# Patient Record
Sex: MALE
[De-identification: names, ages, dates, MRNs, and addresses within clinical notes are randomized per-mention and may not be internally consistent; named-entity substitution may affect disease eponyms.]

---

## 2022-07-21 ENCOUNTER — HOSPITAL ENCOUNTER (EMERGENCY)
Dept: HOSPITAL 83 - ED | Age: 47
Discharge: NURSING, REHAB OR LONG-TERM CARE | End: 2022-07-21
Payer: COMMERCIAL

## 2022-07-21 ENCOUNTER — HOSPITAL ENCOUNTER (INPATIENT)
Age: 47
LOS: 1 days | Discharge: HOME | DRG: 247 | End: 2022-07-23
Attending: FAMILY MEDICINE | Admitting: INTERNAL MEDICINE
Payer: COMMERCIAL

## 2022-07-21 VITALS — WEIGHT: 188 LBS | HEIGHT: 60 IN

## 2022-07-21 DIAGNOSIS — Z88.0: ICD-10-CM

## 2022-07-21 DIAGNOSIS — I10 HYPERTENSION, UNSPECIFIED TYPE: Primary | ICD-10-CM

## 2022-07-21 DIAGNOSIS — Z79.899: ICD-10-CM

## 2022-07-21 DIAGNOSIS — I21.09: Primary | ICD-10-CM

## 2022-07-21 PROBLEM — I21.3 STEMI (ST ELEVATION MYOCARDIAL INFARCTION) (HCC): Status: ACTIVE | Noted: 2022-07-21

## 2022-07-21 PROBLEM — I21.02: Status: ACTIVE | Noted: 2022-07-21

## 2022-07-21 LAB
ABO/RH: NORMAL
ALBUMIN SERPL-MCNC: 4.2 G/DL (ref 3.5–5.2)
ALP BLD-CCNC: 47 U/L (ref 40–129)
ALP SERPL-CCNC: 52 U/L (ref 45–117)
ALT SERPL W P-5'-P-CCNC: 35 U/L (ref 12–78)
ALT SERPL-CCNC: 20 U/L (ref 0–40)
ANION GAP SERPL CALCULATED.3IONS-SCNC: 14 MMOL/L (ref 7–16)
ANTIBODY SCREEN: NORMAL
APTT PPP: 23.2 SECONDS (ref 20–32.1)
AST SERPL-CCNC: 22 IU/L (ref 3–35)
AST SERPL-CCNC: 23 U/L (ref 0–39)
BASOPHILS # BLD AUTO: 0 10*3/UL (ref 0–0.1)
BASOPHILS NFR BLD AUTO: 0.3 % (ref 0–1)
BILIRUB SERPL-MCNC: 0.3 MG/DL (ref 0–1.2)
BUN BLDV-MCNC: 13 MG/DL (ref 6–20)
BUN SERPL-MCNC: 14 MG/DL (ref 7–24)
CALCIUM SERPL-MCNC: 8.3 MG/DL (ref 8.6–10.2)
CHLORIDE BLD-SCNC: 101 MMOL/L (ref 98–107)
CHLORIDE SERPL-SCNC: 106 MMOL/L (ref 98–107)
CO2: 23 MMOL/L (ref 22–29)
CREAT SERPL-MCNC: 0.8 MG/DL (ref 0.7–1.2)
CREAT SERPL-MCNC: 0.98 MG/DL (ref 0.7–1.3)
EOSINOPHIL # BLD AUTO: 0.1 10*3/UL (ref 0–0.4)
EOSINOPHIL # BLD AUTO: 0.7 % (ref 1–4)
ERYTHROCYTE [DISTWIDTH] IN BLOOD BY AUTOMATED COUNT: 12.5 % (ref 0–14.5)
GFR AFRICAN AMERICAN: >60
GFR NON-AFRICAN AMERICAN: >60 ML/MIN/1.73
GLUCOSE BLD-MCNC: 136 MG/DL (ref 74–99)
HBA1C MFR BLD: 5.3 % (ref 4–5.6)
HCT VFR BLD AUTO: 44.6 % (ref 42–52)
HCT VFR BLD CALC: 39.8 % (ref 37–54)
HEMOGLOBIN: 14.3 G/DL (ref 12.5–16.5)
INR BLD: 1 (ref 2–3.5)
INR BLD: 1.2
LIPASE SERPL-CCNC: 93 U/L (ref 73–393)
LYMPHOCYTES # BLD AUTO: 1.8 10*3/UL (ref 1.3–4.4)
LYMPHOCYTES NFR BLD AUTO: 19 % (ref 27–41)
MCH RBC QN AUTO: 29.4 PG (ref 26–35)
MCH RBC QN AUTO: 29.6 PG (ref 27–31)
MCHC RBC AUTO-ENTMCNC: 35.9 % (ref 32–34.5)
MCHC RBC AUTO-ENTMCNC: 35.9 G/DL (ref 33–37)
MCV RBC AUTO: 81.7 FL (ref 80–99.9)
MCV RBC AUTO: 82.4 FL (ref 80–94)
MONOCYTES # BLD AUTO: 0.6 10*3/UL (ref 0.1–1)
MONOCYTES NFR BLD MANUAL: 5.9 % (ref 3–9)
NEUT #: 7.1 10*3/UL (ref 2.3–7.9)
NEUT %: 73.8 % (ref 47–73)
NRBC BLD QL AUTO: 0 10*3/UL (ref 0–0)
PDW BLD-RTO: 12.5 FL (ref 11.5–15)
PLATELET # BLD AUTO: 264 10*3/UL (ref 130–400)
PLATELET # BLD: 268 E9/L (ref 130–450)
PMV BLD AUTO: 10.2 FL (ref 9.6–12.3)
PMV BLD AUTO: 10.5 FL (ref 7–12)
POC ACT LR: 246 SECONDS
POC ACT LR: 337 SECONDS
POTASSIUM SERPL-SCNC: 3 MMOL/L (ref 3.5–5.1)
POTASSIUM SERPL-SCNC: 3.5 MMOL/L (ref 3.5–5)
PROT SERPL-MCNC: 7.4 GM/DL (ref 6.4–8.2)
PROTHROMBIN TIME: 12.5 SEC (ref 9.3–12.4)
RBC # BLD AUTO: 5.41 10*6/UL (ref 4.5–5.9)
RBC # BLD: 4.87 E12/L (ref 3.8–5.8)
SODIUM BLD-SCNC: 138 MMOL/L (ref 132–146)
SODIUM SERPL-SCNC: 140 MMOL/L (ref 136–145)
TOTAL PROTEIN: 6.5 G/DL (ref 6.4–8.3)
TROPONIN, HIGH SENSITIVITY: 3515 NG/L (ref 0–11)
WBC # BLD: 14.7 E9/L (ref 4.5–11.5)
WBC NRBC COR # BLD AUTO: 9.6 10*3/UL (ref 4.8–10.8)

## 2022-07-21 PROCEDURE — 83036 HEMOGLOBIN GLYCOSYLATED A1C: CPT

## 2022-07-21 PROCEDURE — 2500000003 HC RX 250 WO HCPCS

## 2022-07-21 PROCEDURE — 96365 THER/PROPH/DIAG IV INF INIT: CPT

## 2022-07-21 PROCEDURE — 86901 BLOOD TYPING SEROLOGIC RH(D): CPT

## 2022-07-21 PROCEDURE — 93458 L HRT ARTERY/VENTRICLE ANGIO: CPT | Performed by: INTERNAL MEDICINE

## 2022-07-21 PROCEDURE — 2580000003 HC RX 258: Performed by: INTERNAL MEDICINE

## 2022-07-21 PROCEDURE — 93458 L HRT ARTERY/VENTRICLE ANGIO: CPT

## 2022-07-21 PROCEDURE — 92978 ENDOLUMINL IVUS OCT C 1ST: CPT | Performed by: INTERNAL MEDICINE

## 2022-07-21 PROCEDURE — 6360000002 HC RX W HCPCS

## 2022-07-21 PROCEDURE — G0378 HOSPITAL OBSERVATION PER HR: HCPCS

## 2022-07-21 PROCEDURE — 2720000010 HC SURG SUPPLY STERILE

## 2022-07-21 PROCEDURE — C1894 INTRO/SHEATH, NON-LASER: HCPCS

## 2022-07-21 PROCEDURE — 92978 ENDOLUMINL IVUS OCT C 1ST: CPT

## 2022-07-21 PROCEDURE — C1725 CATH, TRANSLUMIN NON-LASER: HCPCS

## 2022-07-21 PROCEDURE — 84484 ASSAY OF TROPONIN QUANT: CPT

## 2022-07-21 PROCEDURE — 85610 PROTHROMBIN TIME: CPT

## 2022-07-21 PROCEDURE — 02C03ZZ EXTIRPATION OF MATTER FROM CORONARY ARTERY, ONE ARTERY, PERCUTANEOUS APPROACH: ICD-10-PCS | Performed by: INTERNAL MEDICINE

## 2022-07-21 PROCEDURE — 2709999900 HC NON-CHARGEABLE SUPPLY

## 2022-07-21 PROCEDURE — C1769 GUIDE WIRE: HCPCS

## 2022-07-21 PROCEDURE — 36415 COLL VENOUS BLD VENIPUNCTURE: CPT

## 2022-07-21 PROCEDURE — 92973 PRQ TRLUML C MCHN ASP THRMBC: CPT | Performed by: INTERNAL MEDICINE

## 2022-07-21 PROCEDURE — B2111ZZ FLUOROSCOPY OF MULTIPLE CORONARY ARTERIES USING LOW OSMOLAR CONTRAST: ICD-10-PCS | Performed by: INTERNAL MEDICINE

## 2022-07-21 PROCEDURE — 4A023N7 MEASUREMENT OF CARDIAC SAMPLING AND PRESSURE, LEFT HEART, PERCUTANEOUS APPROACH: ICD-10-PCS | Performed by: INTERNAL MEDICINE

## 2022-07-21 PROCEDURE — 027034Z DILATION OF CORONARY ARTERY, ONE ARTERY WITH DRUG-ELUTING INTRALUMINAL DEVICE, PERCUTANEOUS APPROACH: ICD-10-PCS | Performed by: INTERNAL MEDICINE

## 2022-07-21 PROCEDURE — 93005 ELECTROCARDIOGRAM TRACING: CPT | Performed by: INTERNAL MEDICINE

## 2022-07-21 PROCEDURE — 92941 PRQ TRLML REVSC TOT OCCL AMI: CPT | Performed by: INTERNAL MEDICINE

## 2022-07-21 PROCEDURE — B241ZZ3 ULTRASONOGRAPHY OF MULTIPLE CORONARY ARTERIES, INTRAVASCULAR: ICD-10-PCS | Performed by: INTERNAL MEDICINE

## 2022-07-21 PROCEDURE — 92941 PRQ TRLML REVSC TOT OCCL AMI: CPT

## 2022-07-21 PROCEDURE — C1887 CATHETER, GUIDING: HCPCS

## 2022-07-21 PROCEDURE — 80053 COMPREHEN METABOLIC PANEL: CPT

## 2022-07-21 PROCEDURE — 99291 CRITICAL CARE FIRST HOUR: CPT | Performed by: INTERNAL MEDICINE

## 2022-07-21 PROCEDURE — 6360000002 HC RX W HCPCS: Performed by: INTERNAL MEDICINE

## 2022-07-21 PROCEDURE — G0379 DIRECT REFER HOSPITAL OBSERV: HCPCS

## 2022-07-21 PROCEDURE — 85347 COAGULATION TIME ACTIVATED: CPT

## 2022-07-21 PROCEDURE — 85027 COMPLETE CBC AUTOMATED: CPT

## 2022-07-21 PROCEDURE — 6370000000 HC RX 637 (ALT 250 FOR IP): Performed by: INTERNAL MEDICINE

## 2022-07-21 PROCEDURE — 86850 RBC ANTIBODY SCREEN: CPT

## 2022-07-21 PROCEDURE — 92973 PRQ TRLUML C MCHN ASP THRMBC: CPT

## 2022-07-21 PROCEDURE — C1874 STENT, COATED/COV W/DEL SYS: HCPCS

## 2022-07-21 PROCEDURE — C1753 CATH, INTRAVAS ULTRASOUND: HCPCS

## 2022-07-21 PROCEDURE — 86900 BLOOD TYPING SEROLOGIC ABO: CPT

## 2022-07-21 RX ORDER — POTASSIUM CHLORIDE 7.45 MG/ML
10 INJECTION INTRAVENOUS
Status: COMPLETED | OUTPATIENT
Start: 2022-07-21 | End: 2022-07-21

## 2022-07-21 RX ORDER — SODIUM CHLORIDE 9 MG/ML
INJECTION, SOLUTION INTRAVENOUS PRN
Status: DISCONTINUED | OUTPATIENT
Start: 2022-07-21 | End: 2022-07-23 | Stop reason: HOSPADM

## 2022-07-21 RX ORDER — ROSUVASTATIN CALCIUM 20 MG/1
40 TABLET, COATED ORAL NIGHTLY
Status: DISCONTINUED | OUTPATIENT
Start: 2022-07-21 | End: 2022-07-23 | Stop reason: HOSPADM

## 2022-07-21 RX ORDER — LABETALOL HYDROCHLORIDE 5 MG/ML
10 INJECTION, SOLUTION INTRAVENOUS EVERY 4 HOURS PRN
Status: DISCONTINUED | OUTPATIENT
Start: 2022-07-21 | End: 2022-07-23 | Stop reason: HOSPADM

## 2022-07-21 RX ORDER — ACETAMINOPHEN 325 MG/1
650 TABLET ORAL EVERY 4 HOURS PRN
Status: DISCONTINUED | OUTPATIENT
Start: 2022-07-21 | End: 2022-07-23 | Stop reason: HOSPADM

## 2022-07-21 RX ORDER — ONDANSETRON 2 MG/ML
4 INJECTION INTRAMUSCULAR; INTRAVENOUS EVERY 6 HOURS PRN
Status: DISCONTINUED | OUTPATIENT
Start: 2022-07-21 | End: 2022-07-23 | Stop reason: HOSPADM

## 2022-07-21 RX ORDER — SODIUM CHLORIDE 0.9 % (FLUSH) 0.9 %
5-40 SYRINGE (ML) INJECTION PRN
Status: DISCONTINUED | OUTPATIENT
Start: 2022-07-21 | End: 2022-07-23 | Stop reason: HOSPADM

## 2022-07-21 RX ORDER — 0.9 % SODIUM CHLORIDE 0.9 %
500 INTRAVENOUS SOLUTION INTRAVENOUS PRN
Status: DISCONTINUED | OUTPATIENT
Start: 2022-07-21 | End: 2022-07-23 | Stop reason: HOSPADM

## 2022-07-21 RX ORDER — ASPIRIN 81 MG/1
81 TABLET ORAL DAILY
Status: DISCONTINUED | OUTPATIENT
Start: 2022-07-22 | End: 2022-07-23 | Stop reason: HOSPADM

## 2022-07-21 RX ORDER — LISINOPRIL 10 MG/1
10 TABLET ORAL DAILY
Status: DISCONTINUED | OUTPATIENT
Start: 2022-07-22 | End: 2022-07-23 | Stop reason: HOSPADM

## 2022-07-21 RX ORDER — SODIUM CHLORIDE 0.9 % (FLUSH) 0.9 %
5-40 SYRINGE (ML) INJECTION EVERY 12 HOURS SCHEDULED
Status: DISCONTINUED | OUTPATIENT
Start: 2022-07-21 | End: 2022-07-23 | Stop reason: HOSPADM

## 2022-07-21 RX ORDER — METOPROLOL SUCCINATE 25 MG/1
25 TABLET, EXTENDED RELEASE ORAL DAILY
Status: DISCONTINUED | OUTPATIENT
Start: 2022-07-21 | End: 2022-07-23 | Stop reason: HOSPADM

## 2022-07-21 RX ORDER — ATROPINE SULFATE 0.4 MG/ML
0.5 AMPUL (ML) INJECTION
Status: ACTIVE | OUTPATIENT
Start: 2022-07-21 | End: 2022-07-21

## 2022-07-21 RX ADMIN — POTASSIUM CHLORIDE 10 MEQ: 7.45 INJECTION INTRAVENOUS at 20:58

## 2022-07-21 RX ADMIN — METOPROLOL SUCCINATE 25 MG: 25 TABLET, EXTENDED RELEASE ORAL at 23:14

## 2022-07-21 RX ADMIN — SODIUM CHLORIDE, PRESERVATIVE FREE 10 ML: 5 INJECTION INTRAVENOUS at 23:18

## 2022-07-21 RX ADMIN — POTASSIUM CHLORIDE 10 MEQ: 7.45 INJECTION INTRAVENOUS at 22:11

## 2022-07-21 RX ADMIN — ROSUVASTATIN 40 MG: 20 TABLET, FILM COATED ORAL at 23:14

## 2022-07-21 ASSESSMENT — LIFESTYLE VARIABLES: HOW OFTEN DO YOU HAVE A DRINK CONTAINING ALCOHOL: NEVER

## 2022-07-21 NOTE — PROCEDURES
CARDIAC CATHETERIZATION REPORT    : Nannette Smiley MD     Date of procedure: 7/21/22     Indication:  Acute anterior MI    Procedures:  Left heart catheterization, Coronary angiogram, Percutaneous transluminal coronary angioplasty and stenting, IVUS, and mechanical aspiration thrombectomy     Sedation/analgesia:  Midazolam IV     Time sedation was administered: 1859. I was present in the room when sedation was administered. Procedure end time: 1934  Time spent with face to face monitoring of moderate sedation: 35 minutes    Brief history:  56-year-old male with type 2 diabetes who presented to outside ED with 30 minutes of severe substernal chest pain with an EKG consistent with anterior injury. He was loaded with aspirin and ticagrelor and heparin and transferred for emergency coronary angiography and PCI. Killip class I. Description of procedure: The patient presented to the Cath Lab in a(n) emergent fashion. The patient was prepped and draped in a sterile manner. Timeout, airway and ASA assessment were completed. Local anesthesia with 2% lidocaine was administered and sedation/analgesia were administered as above. Access was obtained using the modified Seldinger technique and a micropuncture needle in the right radial artery. A 6F slender sheath was inserted over a wire. Diagnostic angiography was performed using 5F JL3.5 and JR4.0  diagnostic catheters. Coronary anatomy:  Dominance: Right  Left main: angiographically unremarkable  Left anterior descending: This is a large vessel that reaches the apex. It gives rise to a large D1 proximally with a smaller D2. There was 100% thrombotic occlusion just past the takeoff of D1. The distal LAD has mild diffuse disease  Ramus intermedius: Diminutive  Left circumflex: This is a large vessel with mild diffuse disease. OM1 is a large bifurcating vessel, OM 2 is a medium sized vessel, OM 3 is a small vessel.   The very distal circumflex is small as well  Right coronary artery: This is a large dominant vessel with mild diffuse disease. It gives rise to a large RPDA that reaches the apex as well as small posterolateral distribution. Percutaneous coronary intervention:  Anticoagulation: Unfractionated heparin with ACT>250 seconds  Aspirin administered Yes  P2Y12 inhibitor: ticagrelor 180 mg PO loading dose was administered before the case. IV eptifibatide was administered as a double bolus without a continuous infusion. Lesion location: Mid LAD; stenosis 100%; SELINA flow 0. Guiding catheter: EBU 3.5. The lesion was crossed with a Prowater wire with moderate difficulty. Mechanical aspiration thrombectomy using a Conmio CAT Rx catheter was performed for 1 pass with restoration of SELINA-3 flow. IVUS was then performed revealing proximal extension of soft atheroma to the ostium of the LAD. Stenting was performed using a 3.5 x 30 Resolute Fabiano RAI from the mid through the ostial LAD. The stent was postdilated with 4.0 NC balloon at high pressure. Post PCI IVUS was performed and revealed adequate expansion and ostial coverage with some protrusion into the left main. D1 was jailed by the stent without significant stenosis or compromised flow. Residual stenosis 0%; post-PCI SELINA flow 3. Complications: None        Hemodynamics:  LVEDP 18  Ao: 126/85 with a mean of 105      Hemostasis:  Transradial band was applied for patent arterial hemostasis. Complications: None  Estimated blood loss: Less than 50 mL  Air kerma: 886 mGy  Contrast used: 155 mL    Conclusions:  Culprit for acute MI was thrombotic occlusion of the LAD just past the first diagonal.  Successful IVUS guided primary PCI with 3.5 x 30 RAI extending to the ostium and postdilated with 4.0 NC balloon at high pressure. Mild nonobstructive residual CAD  Elevated left filling pressure.       Recommendations:  Admit to cardiac telemetry  Aspirin 81 mg daily and ticagrelor 90 mg p.o. twice daily for at least 12 months  Rosuvastatin 40 mg daily  Beta-blocker  ACE inhibitor if blood pressure tolerates  TTE  Cardiac rehab after discharge  He lives in [place], [place].   After discharge he will follow-up with Dr. Bear Segura at [address]  Interventional Cardiology/Structural Heart Disease  Office: [phone]  Coordinator: Paty Porras

## 2022-07-21 NOTE — CONSULTS
Emergency Cardiology Consult Note:    Narrative:  52-year-old  male with history of type 2 diabetes, non-smoker, no recreational drug use who presented to [place] ED with half an hour of crushing substernal chest pain  He was given aspirin and IV nitroglycerin by the time I was called. CBC is normal      Objective:  Blood pressure 120s over 80s with heart rate in the 80s, on room air    General: moderate distress  Lungs: CTAB  Heart: RRR. No M/R/G  Abdomen: soft, NT/ND  Extremities: no pitting edema. Warm. Neuro: A&Ox3, MAEx4    Twelve-lead EKG with acute anterior injury    Outside labs reviewed with referring emergency room physician. CBC unremarkable. BUN 14, creatinine 0.98, potassium 3.0    Not on File      Assessment:  Acute anterior MI, Killip class I  Type 2 diabetes        Recommendations:  Unfractionated heparin 4000 units IV then 1000 units an hour  Ticagrelor 180 mg p.o. crushed  Emergency transfer for coronary angiography and PCI as needed    Critical care time 35 minutes spent reviewing data, documentation, exam, formulating a therapeutic plan and discussing with family/care team.     We will follow.       Ventura Garcia MD, [facility]  Interventional Cardiology/Structural Heart Disease  Office: [phone]

## 2022-07-22 PROBLEM — I25.5 ISCHEMIC CARDIOMYOPATHY: Status: ACTIVE | Noted: 2022-07-22

## 2022-07-22 PROBLEM — E78.5 HLD (HYPERLIPIDEMIA): Status: ACTIVE | Noted: 2022-07-22

## 2022-07-22 PROBLEM — I10 HTN (HYPERTENSION): Status: ACTIVE | Noted: 2022-07-22

## 2022-07-22 LAB
ANION GAP SERPL CALCULATED.3IONS-SCNC: 12 MMOL/L (ref 7–16)
BUN BLDV-MCNC: 12 MG/DL (ref 6–20)
CALCIUM SERPL-MCNC: 9 MG/DL (ref 8.6–10.2)
CHLORIDE BLD-SCNC: 104 MMOL/L (ref 98–107)
CHOLESTEROL, TOTAL: 256 MG/DL (ref 0–199)
CO2: 24 MMOL/L (ref 22–29)
CREAT SERPL-MCNC: 0.8 MG/DL (ref 0.7–1.2)
EKG ATRIAL RATE: 74 BPM
EKG ATRIAL RATE: 98 BPM
EKG P AXIS: 53 DEGREES
EKG P AXIS: 68 DEGREES
EKG P-R INTERVAL: 138 MS
EKG Q-T INTERVAL: 340 MS
EKG Q-T INTERVAL: 368 MS
EKG QRS DURATION: 122 MS
EKG QRS DURATION: 80 MS
EKG QTC CALCULATION (BAZETT): 377 MS
EKG QTC CALCULATION (BAZETT): 467 MS
EKG R AXIS: -88 DEGREES
EKG R AXIS: 92 DEGREES
EKG T AXIS: 57 DEGREES
EKG T AXIS: 82 DEGREES
EKG VENTRICULAR RATE: 74 BPM
EKG VENTRICULAR RATE: 97 BPM
GFR AFRICAN AMERICAN: >60
GFR NON-AFRICAN AMERICAN: >60 ML/MIN/1.73
GLUCOSE BLD-MCNC: 112 MG/DL (ref 74–99)
HCT VFR BLD CALC: 45.9 % (ref 37–54)
HDLC SERPL-MCNC: 51 MG/DL
HEMOGLOBIN: 16.3 G/DL (ref 12.5–16.5)
LDL CHOLESTEROL CALCULATED: 180 MG/DL (ref 0–99)
LV EF: 38 %
LVEF MODALITY: NORMAL
MCH RBC QN AUTO: 30 PG (ref 26–35)
MCHC RBC AUTO-ENTMCNC: 35.5 % (ref 32–34.5)
MCV RBC AUTO: 84.4 FL (ref 80–99.9)
PDW BLD-RTO: 12.7 FL (ref 11.5–15)
PLATELET # BLD: 269 E9/L (ref 130–450)
PMV BLD AUTO: 10.1 FL (ref 7–12)
POTASSIUM SERPL-SCNC: 3.4 MMOL/L (ref 3.5–5)
RBC # BLD: 5.44 E12/L (ref 3.8–5.8)
SODIUM BLD-SCNC: 140 MMOL/L (ref 132–146)
TRIGL SERPL-MCNC: 123 MG/DL (ref 0–149)
TROPONIN, HIGH SENSITIVITY: 4519 NG/L (ref 0–11)
TROPONIN, HIGH SENSITIVITY: 5388 NG/L (ref 0–11)
TROPONIN, HIGH SENSITIVITY: 6900 NG/L (ref 0–11)
TROPONIN, HIGH SENSITIVITY: 7289 NG/L (ref 0–11)
VLDLC SERPL CALC-MCNC: 25 MG/DL
WBC # BLD: 10.3 E9/L (ref 4.5–11.5)

## 2022-07-22 PROCEDURE — C1894 INTRO/SHEATH, NON-LASER: HCPCS

## 2022-07-22 PROCEDURE — 2140000000 HC CCU INTERMEDIATE R&B

## 2022-07-22 PROCEDURE — 93306 TTE W/DOPPLER COMPLETE: CPT

## 2022-07-22 PROCEDURE — 85027 COMPLETE CBC AUTOMATED: CPT

## 2022-07-22 PROCEDURE — 80061 LIPID PANEL: CPT

## 2022-07-22 PROCEDURE — C1725 CATH, TRANSLUMIN NON-LASER: HCPCS

## 2022-07-22 PROCEDURE — 2720000010 HC SURG SUPPLY STERILE

## 2022-07-22 PROCEDURE — 99233 SBSQ HOSP IP/OBS HIGH 50: CPT | Performed by: INTERNAL MEDICINE

## 2022-07-22 PROCEDURE — 93005 ELECTROCARDIOGRAM TRACING: CPT | Performed by: INTERNAL MEDICINE

## 2022-07-22 PROCEDURE — 2580000003 HC RX 258: Performed by: INTERNAL MEDICINE

## 2022-07-22 PROCEDURE — C1887 CATHETER, GUIDING: HCPCS

## 2022-07-22 PROCEDURE — C1874 STENT, COATED/COV W/DEL SYS: HCPCS

## 2022-07-22 PROCEDURE — C1753 CATH, INTRAVAS ULTRASOUND: HCPCS

## 2022-07-22 PROCEDURE — 2709999900 HC NON-CHARGEABLE SUPPLY

## 2022-07-22 PROCEDURE — 84484 ASSAY OF TROPONIN QUANT: CPT

## 2022-07-22 PROCEDURE — 36415 COLL VENOUS BLD VENIPUNCTURE: CPT

## 2022-07-22 PROCEDURE — C1769 GUIDE WIRE: HCPCS

## 2022-07-22 PROCEDURE — 6370000000 HC RX 637 (ALT 250 FOR IP): Performed by: INTERNAL MEDICINE

## 2022-07-22 PROCEDURE — 80048 BASIC METABOLIC PNL TOTAL CA: CPT

## 2022-07-22 RX ORDER — POTASSIUM CHLORIDE 20 MEQ/1
40 TABLET, EXTENDED RELEASE ORAL ONCE
Status: COMPLETED | OUTPATIENT
Start: 2022-07-22 | End: 2022-07-22

## 2022-07-22 RX ORDER — HYDROCHLOROTHIAZIDE 25 MG/1
25 TABLET ORAL DAILY
Status: ON HOLD | COMMUNITY
End: 2022-07-23 | Stop reason: HOSPADM

## 2022-07-22 RX ORDER — ROSUVASTATIN CALCIUM 5 MG/1
5 TABLET, COATED ORAL DAILY
Status: ON HOLD | COMMUNITY
End: 2022-07-23 | Stop reason: HOSPADM

## 2022-07-22 RX ADMIN — TICAGRELOR 90 MG: 90 TABLET ORAL at 07:28

## 2022-07-22 RX ADMIN — LISINOPRIL 10 MG: 10 TABLET ORAL at 09:03

## 2022-07-22 RX ADMIN — METOPROLOL SUCCINATE 25 MG: 25 TABLET, EXTENDED RELEASE ORAL at 07:28

## 2022-07-22 RX ADMIN — POTASSIUM CHLORIDE 40 MEQ: 1500 TABLET, EXTENDED RELEASE ORAL at 09:03

## 2022-07-22 RX ADMIN — ROSUVASTATIN 40 MG: 20 TABLET, FILM COATED ORAL at 21:18

## 2022-07-22 RX ADMIN — ASPIRIN 81 MG: 81 TABLET, COATED ORAL at 07:28

## 2022-07-22 RX ADMIN — SODIUM CHLORIDE, PRESERVATIVE FREE 10 ML: 5 INJECTION INTRAVENOUS at 21:20

## 2022-07-22 RX ADMIN — ACETAMINOPHEN 325MG 650 MG: 325 TABLET ORAL at 19:43

## 2022-07-22 RX ADMIN — TICAGRELOR 90 MG: 90 TABLET ORAL at 21:18

## 2022-07-22 RX ADMIN — SODIUM CHLORIDE, PRESERVATIVE FREE 10 ML: 5 INJECTION INTRAVENOUS at 07:31

## 2022-07-22 ASSESSMENT — PAIN SCALES - GENERAL
PAINLEVEL_OUTOF10: 2
PAINLEVEL_OUTOF10: 3
PAINLEVEL_OUTOF10: 0

## 2022-07-22 ASSESSMENT — PAIN DESCRIPTION - LOCATION
LOCATION: CHEST
LOCATION: CHEST;BACK

## 2022-07-22 ASSESSMENT — PAIN DESCRIPTION - ORIENTATION: ORIENTATION: MID

## 2022-07-22 ASSESSMENT — PAIN DESCRIPTION - DESCRIPTORS: DESCRIPTORS: ACHING;DULL;NAGGING

## 2022-07-22 NOTE — CONSULTS
Met with patient and discussed that their physician has ordered a referral to our outpatient Phase II Cardiac Rehabilitation program. Reviewed the benefits of cardiac rehabilitation based on their diagnosis and personal risk factors. Patient demonstrates strong interest in Cardiac Rehabilitation at this time. Cardiac Rehabilitation brochure provided to patient/family. The Cardiac Rehabilitation Program has been provided the patient's referral information and pertinent patient details and history. The patient may call [address] [place] [address] at [phone] for additional information or questions. Contact information for [address] [place] [address] and other choices close to the patient's residence have been provided in the discharge instructions so that the patient may call and schedule an appointment when cleared by their physician.  Thank you for the referral.

## 2022-07-22 NOTE — PLAN OF CARE
Problem: Discharge Planning  Goal: Discharge to home or other facility with appropriate resources  7/22/2022 0827 by Bahman Ordonez RN  Outcome: Progressing  Flowsheets (Taken 7/22/2022 1007)  Discharge to home or other facility with appropriate resources: Identify barriers to discharge with patient and caregiver    Problem: Chronic Conditions and Co-morbidities  Goal: Patient's chronic conditions and co-morbidity symptoms are monitored and maintained or improved  7/22/2022 0827 by Bahman Ordonez RN  Outcome: Progressing  Flowsheets (Taken 7/22/2022 0722)  Care Plan - Patient's Chronic Conditions and Co-Morbidity Symptoms are Monitored and Maintained or Improved: Monitor and assess patient's chronic conditions and comorbid symptoms for stability, deterioration, or improvement    Problem: Pain  Goal: Verbalizes/displays adequate comfort level or baseline comfort level  7/22/2022 0827 by Bahman Ordonez RN  Outcome: Progressing

## 2022-07-22 NOTE — CARE COORDINATION
Social Work Discharge Planning:  SW met with patient at bedside. Patient is from [facility]. Patient lives with wife and kids in a 2 story home. Patient independent prior to admission. He has no hx with HHC or SNF. Patient PCP is Dr. Katie Ho and pharmacy is [facility]. Patient anticipate no needs and wife will transport home at discharge. SW will continue to follow and assist with transition of care.   Electronically signed by Neomi Bosworth, LSW on 7/22/2022 at 9:07 AM

## 2022-07-22 NOTE — PROGRESS NOTES
Hospitalist Progress Note      Synopsis: Patient admitted on 7/21/2022 40y.o. year old male  who  has a past medical history of Diabetes mellitus ([address] Hyperlipidemia, and Hypertension. Patient transferred from [facility] after presenting with substernal chest pain. Diagnosed with acute anterior MI. Transferred to [facility] for cardiac catheterization. This was performed without complications and revealed thrombotic occlusion of the LAD. Successful IVUS guided primary PCI with 3.5 x 30 RAI extending to the ostium and postdilated with 4.0 NC balloon at high pressure. Medicine consulted for admission. Subjective    Clinically improving. Feeling better. Stable overnight. No other overnight issues reported. No CP, SOB, palpitations, blurred vision, HA, lightheadedness, LOC or focal neurological deficits    Exam:  BP (!) 137/93   Pulse 74   Temp 97 °F (36.1 °C) (Temporal)   Resp 18   Ht 5' 10\" (1.778 m)   Wt 188 lb (85.3 kg)   SpO2 99%   BMI 26.98 kg/m²   General appearance: No apparent distress, appears stated age and cooperative. HEENT: Pupils equal, round, and reactive to light. Conjunctivae/corneas clear. Neck: Supple. No jugular venous distention. Trachea midline. Respiratory:  Normal respiratory effort. Clear to auscultation, bilaterally without Rales/Wheezes/Rhonchi. Cardiovascular: Regular rate and rhythm with normal S1/S2 without murmurs, rubs or gallops. Abdomen: Soft, non-tender, non-distended with normal bowel sounds. Musculoskeletal: No clubbing, cyanosis or edema bilaterally. Brisk capillary refill. 2+ lower extremity pulses (dorsalis pedis).    Skin:  No rashes    Neurologic: awake, alert and following commands     Medications:  Reviewed    Infusion Medications    sodium chloride       Scheduled Medications    sodium chloride flush  5-40 mL IntraVENous 2 times per day    metoprolol succinate  25 mg Oral Daily    lisinopril  10 mg Oral Daily rosuvastatin  40 mg Oral Nightly    aspirin  81 mg Oral Daily    ticagrelor  90 mg Oral BID     PRN Meds: sodium chloride flush, sodium chloride, acetaminophen, sodium chloride, ondansetron, labetalol, perflutren lipid microspheres    I/O    Intake/Output Summary (Last 24 hours) at 7/22/2022 1055  Last data filed at 7/22/2022 0818  Gross per 24 hour   Intake 120 ml   Output 700 ml   Net -580 ml       Labs:   Recent Labs     07/21/22 1920 07/22/22  0631   WBC 14.7* 10.3   HGB 14.3 16.3   HCT 39.8 45.9    269       Recent Labs     07/21/22 1920 07/22/22  0631    140   K 3.5 3.4*    104   CO2 23 24   BUN 13 12   CREATININE 0.8 0.8   CALCIUM 8.3* 9.0       Recent Labs     07/21/22 1920   PROT 6.5   ALKPHOS 47   ALT 20   AST 23   BILITOT 0.3       Recent Labs     07/21/22 1920   INR 1.2       No results for input(s): Patrisha Meigs in the last 72 hours. Chronic labs:  Lab Results   Component Value Date    CHOL 256 (H) 07/22/2022    TRIG 123 07/22/2022    HDL 51 07/22/2022    LDLCALC 180 (H) 07/22/2022    INR 1.2 07/21/2022    LABA1C 5.3 07/21/2022       Radiology:  Imaging studies reviewed today. ASSESSMENT:    Active Problems:    STEMI (ST elevation myocardial infarction) ([address]    HLD (hyperlipidemia)    HTN (hypertension)    Ischemic cardiomyopathy  Resolved Problems:    * No resolved hospital problems. *       PLAN:  Anterior STEMI status post RAI to mid LAD  -Admit to intermediate care  -Cardiology consult appreciated  -Aspirin 81 mg daily  -Brilinta 90 mg twice daily  -Rosuvastatin 40 mg daily  -GDMT-lisinopril, metoprolol succinate  -TTE demonstrating EF 35-40 %  -Cardiac rehab after discharge  A1c 5.3    Diet: ADULT DIET; Regular;  No Added Salt (3-4 gm)  Code Status: Full Code  PT/OT Eval Status:     DVT Prophylaxis:     Recommended disposition at discharge:      +++++++++++++++++++++++++++++++++++++++++++++++++  Tomás Li MD   [facility].  +++++++++++++++++++++++++++++++++++++++++++++++++  NOTE: This report was transcribed using voice recognition software. Every effort was made to ensure accuracy; however, inadvertent computerized transcription errors may be present.

## 2022-07-22 NOTE — CONSULTS
Patient extremely interested in starting outpatient cardiac rehab. He is from the [facility] area. [address] called and updated on patient. His phase 2 referral and cardiac information faxed to [phone] for them to process insurance and get him started. Thank you for the information.

## 2022-07-22 NOTE — DISCHARGE INSTRUCTIONS
Mr Ramses Ruth may return to work 7/25 with no heavy lifting or strenous activity. Cardiac Rehabilitation: Discharge instructions        Cardiac rehabilitation is a program for people who have a heart problem, such as a heart attack, coronary stent placed, heart failure, or a heart valve disease. The program includes exercise, lifestyle changes, education, and emotional support. Cardiac rehab can help you improve the quality of your life through better overall health. It can help you lose weight and feel better about yourself. On your cardiac rehab team, you may have your doctor, a nurse specialist, an exercise physiologist, and a dietitian. They will design your cardiac rehab program specifically for you. You will learn how to reduce your risk for heart problems, how to manage stress, and how to eat a heart-healthy diet. By the end of the program, you will be ready to maintain a healthier lifestyle on your own. Follow-up care is a key part of your treatment and safety. Be sure to make and go to all appointments, and call your doctor if you are having problems. It's also a good idea to know your test results and keep a list of the medicines you take. Please call to schedule your first appointment once you have been cleared by your cardiologist to attend Phase II Outpatient Cardiac Rehabilitation. Cardiac Rehabilitation Options:  [address] [place] [address].                                                                                           Cardiology Services  [place], [address]                                                                              [facility] [address], [facility]  Hours: M/W/F 7am-7pm & T/Th 7am-12pm                                                  [phone]

## 2022-07-22 NOTE — H&P
Hospitalist History & Physical      PCP: No primary care provider on file. Date of Service:  7/21/2022     Chief Complaint:  had no chief complaint listed for this encounter. History Of Present Illness:    Mr. Joseph Ferrell, a 40y.o. year old male  who  has a past medical history of Diabetes mellitus ([address] Hyperlipidemia, and Hypertension. Patient transferred from [facility] after presenting with substernal chest pain. Diagnosed with acute anterior MI. Transferred to [facility] for cardiac catheterization. This was performed without complications and revealed thrombotic occlusion of the LAD. Successful IVUS guided primary PCI with 3.5 x 30 RAI extending to the ostium and postdilated with 4.0 NC balloon at high pressure. Medicine consulted for admission. Past Medical History:   Diagnosis Date    Diabetes mellitus ([address]     Hyperlipidemia     Hypertension        Past Surgical History:   Procedure Laterality Date    CORONARY ANGIOPLASTY WITH STENT PLACEMENT  07/21/2022    3.5 x 30mm Resolute RAI to Prox LAD. Dr. Audrey Quezada       Prior to Admission medications    Not on File         Allergies:  Penicillins    Social History:    TOBACCO:   has no history on file for tobacco use. ETOH:   has no history on file for alcohol use. Family History:    Reviewed in detail and negative for DM, CAD, Cancer, CVA. Positive as follows\"  No family history on file. REVIEW OF SYSTEMS:   Pertinent positives as noted in the HPI. All other systems reviewed and negative.     PHYSICAL EXAM:  /71   Pulse 94   Ht 5' 10\" (1.778 m)   Wt 188 lb (85.3 kg)   SpO2 98%   BMI 26.98 kg/m²         CBC:   Recent Labs     07/21/22 1920   WBC 14.7*   RBC 4.87   HGB 14.3   HCT 39.8   MCV 81.7   RDW 12.5        BMP:   Recent Labs     07/21/22 1920      K 3.5      CO2 23   BUN 13   CREATININE 0.8     LFT:  Recent Labs     07/21/22 1920   PROT 6.5   ALKPHOS 47   ALT 20   AST 23 BILITOT 0.3     CE:  No results for input(s): Zion Siddiqi in the last 72 hours. PT/INR:   Recent Labs     07/21/22  1920   INR 1.2     BNP: No results for input(s): BNP in the last 72 hours. ESR: No results found for: SEDRATE  CRP: No results found for: CRP  D Dimer: No results found for: DDIMER   Folate and B12: No results found for: DNDOXGVZ59, No results found for: FOLATE  Lactic Acid: No results found for: LACTA  Thyroid Studies: No results found for: TSH, L6KPTHY, V2HZGZY, THYROIDAB    Oupatient labs:  Lab Results   Component Value Date    INR 1.2 07/21/2022       Urinalysis:  No results found for: NITRU, WBCUA, BACTERIA, RBCUA, BLOODU, SPECGRAV, GLUCOSEU    Imaging:  No results found. ASSESSMENT:  -Acute anterior MI  -Coronary artery disease  -Diabetes mellitus type 2      PLAN:  -Admit to medicine  -Cardiology following   -Aspirin 81 mg daily  -Brilinta 90 mg twice daily  -Rosuvastatin 40 mg daily  -Beta-blocker  -TTE  -Cardiac rehab after discharge      Diet: ADULT DIET; Regular; No Added Salt (3-4 gm)  Code Status: Full Code  Surrogate decision maker confirmed with patient:   No emergency contact information on file. DVT Prophylaxis: []Lovenox []Heparin []PCD [] [address] []Encouraged ambulation  Disposition: []Med/Surg [] Intermediate [] ICU/CCU  Admit status: [] Observation [] Inpatient     +++++++++++++++++++++++++++++++++++++++++++++++++  Gianluca More, DO  +++++++++++++++++++++++++++++++++++++++++++++++++  NOTE: This report was transcribed using voice recognition software. Every effort was made to ensure accuracy; however, inadvertent computerized transcription errors may be present.

## 2022-07-22 NOTE — PROGRESS NOTES
Inpatient Cardiology Progress Note     PATIENT IS BEING FOLLOWED FOR: CAD, s/p STEMI     Frank Tamayo is a 40 y.o. male who was seen by Dr. Aleida Mckeon on July 21, 2022. SUBJECTIVE: Chest \"soreness\" that improves with getting out of bed and walking. No shortness of breath. OBJECTIVE: No apparent distress     ROS:  Consist: Denies fevers, chills or night sweats  Heart: Denies  palpitations, lightheadedness, dizziness or syncope  Lungs: Denies SOB, cough, wheezing, orthopnea or PND  GI: Denies abdominal pain, vomiting or diarrhea    PHYSICAL EXAM:   /88   Pulse 77   Temp 97.6 °F (36.4 °C) (Temporal)   Resp 14   Ht 5' 10\" (1.778 m)   Wt 188 lb (85.3 kg)   SpO2 97%   BMI 26.98 kg/m²    B/P Range last 24 hours: Systolic (09MRN), JHK:358 , Min:112 , ZSA:359    Diastolic (96BHK), YGL:06, Min:71, Max:101    CONST: Well developed, well nourished male who appears of stated age. Awake, alert and cooperative. No apparent distress  HEENT:   Head- Normocephalic, atraumatic   Eyes- Conjunctivae pink, anicteric  Throat- Oral mucosa pink and moist  Neck-  No stridor, trachea midline, no jugular venous distention. No carotid bruit  CHEST: Chest symmetrical and non-tender to palpation. No accessory muscle use or intercostal retractions  RESPIRATORY:  Lung sounds - clear throughout fields   CARDIOVASCULAR:     Heart Inspection- shows no noted pulsations  Heart Palpation- no heaves or thrills; PMI is non-displaced   Heart Ausculation- Regular rate and rhythm, no murmur. No s3, s4 or rub   PV: No lower extremity edema. No varicosities. Pedal pulses palpable, no clubbing or cyanosis. Right radial access site without hematoma and with preserved pulse  ABDOMEN: Soft, non-tender to light palpation. Bowel sounds present. No palpable masses no organomegaly; no abdominal bruit  MS: Good muscle strength and tone. No atrophy or abnormal movements.    : Deferred  SKIN: Warm and dry no statis dermatitis or ulcers   NEURO / PSYCH: Oriented to person, place and time. Speech clear and appropriate. Follows all commands. Pleasant affect       Intake/Output Summary (Last 24 hours) at 7/22/2022 1343  Last data filed at 7/22/2022 0818  Gross per 24 hour   Intake 120 ml   Output 700 ml   Net -580 ml       Weight:   Wt Readings from Last 3 Encounters:   07/21/22 188 lb (85.3 kg)     Current Inpatient Medications:   sodium chloride flush  5-40 mL IntraVENous 2 times per day    metoprolol succinate  25 mg Oral Daily    lisinopril  10 mg Oral Daily    rosuvastatin  40 mg Oral Nightly    aspirin  81 mg Oral Daily    ticagrelor  90 mg Oral BID       IV Infusions (if any):   sodium chloride         DIAGNOSTIC/ LABORATORY DATA:  Labs:   CBC:   Recent Labs     07/21/22 1920 07/22/22  0631   WBC 14.7* 10.3   HGB 14.3 16.3   HCT 39.8 45.9    269     BMP:   Recent Labs     07/21/22 1920 07/22/22  0631    140   K 3.5 3.4*   CO2 23 24   BUN 13 12   CREATININE 0.8 0.8   LABGLOM >60 >60   CALCIUM 8.3* 9.0       HgA1c:   Lab Results   Component Value Date    LABA1C 5.3 07/21/2022       PT/INR:   Recent Labs     07/21/22 1920   PROTIME 12.5*   INR 1.2       CARDIAC ENZYMES:  Recent Labs     07/21/22 2102 07/22/22  0631   TROPHS 3,515* 7,289*  6,900*     FASTING LIPID PANEL:  Lab Results   Component Value Date/Time    CHOL 256 07/22/2022 06:31 AM    HDL 51 07/22/2022 06:31 AM    LDLCALC 180 07/22/2022 06:31 AM    TRIG 123 07/22/2022 06:31 AM     LIVER PROFILE:  Recent Labs     07/21/22 1920   AST 23   ALT 20   LABALBU 4.2       Telemetry: SR, 90s    12 lead EKG 7/22/2022:   Normal sinus rhythm with sinus arrhythmia  Possible Left atrial enlargement  Rightward axis  Anteroseptal infarct , age undetermined (with persistent ST elevations in V1 to V5)     Left heart catheterization 7/21/2022 (Dr. Elias Morning):  Dominance: Right  Left main: angiographically unremarkable  Left anterior descending: This is a large vessel that reaches the apex.   It gives rise to a large D1 proximally with a smaller D2. There was 100% thrombotic occlusion just past the takeoff of D1. The distal LAD has mild diffuse disease  Ramus intermedius: Diminutive  Left circumflex: This is a large vessel with mild diffuse disease. OM1 is a large bifurcating vessel, OM 2 is a medium sized vessel, OM 3 is a small vessel. The very distal circumflex is small as well  Right coronary artery: This is a large dominant vessel with mild diffuse disease. It gives rise to a large RPDA that reaches the apex as well as small posterolateral distribution. Culprit for acute MI was thrombotic occlusion of the LAD just past the first diagonal.  Successful IVUS guided primary PCI with 3.5 x 30 RAI extending to the ostium and postdilated with 4.0 NC balloon at high pressure. Mild nonobstructive residual CAD  Elevated left filling pressure. Echocardiogram 7/22/2022 (Dr. Louisa Modi):   Summary   Left ventricle is normal in size. Normal left ventricle wall thickness. There is severe hypokinesis to akinesis of the apex, anteroseptal wall,  distal anterior wall and the distal septum   Ejection fraction is visually estimated at 35-40%. There is doppler evidence of stage I diastolic dysfunction. Normal right ventricular size and function. No significant valvular abnormalities. ASSESSMENT:   Coronary artery disease/ischemic cardiomyopathy s/p  with anterior STEMI s/p PCI/RAI to LAD  Type II diabetes mellitus  Hypertension   Hyperlipidemia  Type II diabetes mellitus  Hypokalemia     PLAN:  Supplement potassium   Add spironolactone  Rest of cardiac medications same  Tentative discharge tomorrow   Will follow   6.   He is to follow with Dr. Liseth Eckert at [facility]     Electronically signed by Charanjit Hernandez MD on 7/22/2022 at 1:43 PM

## 2022-07-22 NOTE — PROGRESS NOTES
Called lab for status of troponin level drawn at 1230. Was told troponin is currently being processed.

## 2022-07-23 VITALS
DIASTOLIC BLOOD PRESSURE: 79 MMHG | OXYGEN SATURATION: 97 % | RESPIRATION RATE: 16 BRPM | BODY MASS INDEX: 26.92 KG/M2 | HEIGHT: 70 IN | TEMPERATURE: 98.4 F | SYSTOLIC BLOOD PRESSURE: 112 MMHG | WEIGHT: 188 LBS | HEART RATE: 87 BPM

## 2022-07-23 LAB
ANION GAP SERPL CALCULATED.3IONS-SCNC: 13 MMOL/L (ref 7–16)
BUN BLDV-MCNC: 9 MG/DL (ref 6–20)
CALCIUM SERPL-MCNC: 9 MG/DL (ref 8.6–10.2)
CHLORIDE BLD-SCNC: 106 MMOL/L (ref 98–107)
CO2: 19 MMOL/L (ref 22–29)
CREAT SERPL-MCNC: 0.8 MG/DL (ref 0.7–1.2)
GFR AFRICAN AMERICAN: >60
GFR NON-AFRICAN AMERICAN: >60 ML/MIN/1.73
GLUCOSE BLD-MCNC: 98 MG/DL (ref 74–99)
HCT VFR BLD CALC: 48.2 % (ref 37–54)
HEMOGLOBIN: 16.6 G/DL (ref 12.5–16.5)
MCH RBC QN AUTO: 29.4 PG (ref 26–35)
MCHC RBC AUTO-ENTMCNC: 34.4 % (ref 32–34.5)
MCV RBC AUTO: 85.3 FL (ref 80–99.9)
PDW BLD-RTO: 12.6 FL (ref 11.5–15)
PLATELET # BLD: 265 E9/L (ref 130–450)
PMV BLD AUTO: 10.4 FL (ref 7–12)
POTASSIUM SERPL-SCNC: 4.3 MMOL/L (ref 3.5–5)
RBC # BLD: 5.65 E12/L (ref 3.8–5.8)
SODIUM BLD-SCNC: 138 MMOL/L (ref 132–146)
WBC # BLD: 10.8 E9/L (ref 4.5–11.5)

## 2022-07-23 PROCEDURE — 80048 BASIC METABOLIC PNL TOTAL CA: CPT

## 2022-07-23 PROCEDURE — 6370000000 HC RX 637 (ALT 250 FOR IP): Performed by: INTERNAL MEDICINE

## 2022-07-23 PROCEDURE — 99231 SBSQ HOSP IP/OBS SF/LOW 25: CPT | Performed by: INTERNAL MEDICINE

## 2022-07-23 PROCEDURE — 36415 COLL VENOUS BLD VENIPUNCTURE: CPT

## 2022-07-23 PROCEDURE — 85027 COMPLETE CBC AUTOMATED: CPT

## 2022-07-23 RX ORDER — ASPIRIN 81 MG/1
81 TABLET ORAL DAILY
Qty: 30 TABLET | Refills: 3 | Status: SHIPPED | OUTPATIENT
Start: 2022-07-24

## 2022-07-23 RX ORDER — SPIRONOLACTONE 25 MG/1
25 TABLET ORAL DAILY
Status: DISCONTINUED | OUTPATIENT
Start: 2022-07-23 | End: 2022-07-23 | Stop reason: HOSPADM

## 2022-07-23 RX ORDER — ROSUVASTATIN CALCIUM 40 MG/1
40 TABLET, COATED ORAL NIGHTLY
Qty: 30 TABLET | Refills: 3 | Status: SHIPPED | OUTPATIENT
Start: 2022-07-23

## 2022-07-23 RX ORDER — SPIRONOLACTONE 25 MG/1
25 TABLET ORAL DAILY
Qty: 30 TABLET | Refills: 3 | Status: SHIPPED | OUTPATIENT
Start: 2022-07-23

## 2022-07-23 RX ORDER — LISINOPRIL 10 MG/1
10 TABLET ORAL DAILY
Qty: 30 TABLET | Refills: 3 | Status: SHIPPED | OUTPATIENT
Start: 2022-07-24

## 2022-07-23 RX ORDER — METOPROLOL SUCCINATE 25 MG/1
25 TABLET, EXTENDED RELEASE ORAL DAILY
Qty: 30 TABLET | Refills: 3 | Status: SHIPPED | OUTPATIENT
Start: 2022-07-24

## 2022-07-23 RX ADMIN — TICAGRELOR 90 MG: 90 TABLET ORAL at 08:53

## 2022-07-23 RX ADMIN — LISINOPRIL 10 MG: 10 TABLET ORAL at 08:53

## 2022-07-23 RX ADMIN — ACETAMINOPHEN 325MG 650 MG: 325 TABLET ORAL at 08:54

## 2022-07-23 RX ADMIN — ASPIRIN 81 MG: 81 TABLET, COATED ORAL at 08:53

## 2022-07-23 RX ADMIN — METOPROLOL SUCCINATE 25 MG: 25 TABLET, EXTENDED RELEASE ORAL at 08:53

## 2022-07-23 ASSESSMENT — PAIN DESCRIPTION - DESCRIPTORS
DESCRIPTORS: ACHING
DESCRIPTORS: ACHING

## 2022-07-23 ASSESSMENT — PAIN SCALES - GENERAL
PAINLEVEL_OUTOF10: 0
PAINLEVEL_OUTOF10: 3
PAINLEVEL_OUTOF10: 3

## 2022-07-23 ASSESSMENT — PAIN DESCRIPTION - LOCATION
LOCATION: HEAD
LOCATION: HEAD

## 2022-07-23 ASSESSMENT — PAIN DESCRIPTION - PAIN TYPE: TYPE: ACUTE PAIN

## 2022-07-23 ASSESSMENT — PAIN DESCRIPTION - FREQUENCY: FREQUENCY: INTERMITTENT

## 2022-07-23 ASSESSMENT — PAIN DESCRIPTION - ORIENTATION
ORIENTATION: MID
ORIENTATION: MID

## 2022-07-23 NOTE — PROGRESS NOTES
PROGRESS NOTE     CARDIOLOGY    Chief complaint: Seen today for follow up, management & recommendations for coronary artery disease, STEMI. He denies chest pain or shortness of breath today. He was sitting in the chair at the side of the bed. He has been ambulating. He denies any symptoms with ambulation. He is comfortable and in no distress. Wt Readings from Last 3 Encounters:   07/21/22 188 lb (85.3 kg)     Temp Readings from Last 3 Encounters:   07/23/22 97.7 °F (36.5 °C) (Temporal)     BP Readings from Last 3 Encounters:   07/23/22 111/75     Pulse Readings from Last 3 Encounters:   07/23/22 97         Intake/Output Summary (Last 24 hours) at 7/23/2022 1143  Last data filed at 7/22/2022 1459  Gross per 24 hour   Intake 240 ml   Output --   Net 240 ml       Recent Labs     07/21/22  1920 07/22/22  0631 07/23/22  0605   WBC 14.7* 10.3 10.8   HGB 14.3 16.3 16.6*   HCT 39.8 45.9 48.2   MCV 81.7 84.4 85.3    269 265     Recent Labs     07/21/22  1920 07/22/22  0631 07/23/22  0605    140 138   K 3.5 3.4* 4.3    104 106   CO2 23 24 19*   BUN 13 12 9   CREATININE 0.8 0.8 0.8     Recent Labs     07/21/22  1920   PROTIME 12.5*   INR 1.2     No results for input(s): CKTOTAL, CKMB, CKMBINDEX, TROPONINI in the last 72 hours. No results for input(s): BNP in the last 72 hours.   Recent Labs     07/22/22  0631   CHOL 256*   HDL 51   TRIG 123     Recent Labs     07/22/22  0631 07/22/22  1227 07/22/22  1808   TROPHS 7,289*  6,900* 4,519* 5,388*         spironolactone (ALDACTONE) tablet 25 mg, Daily  sodium chloride flush 0.9 % injection 5-40 mL, 2 times per day  sodium chloride flush 0.9 % injection 5-40 mL, PRN  0.9 % sodium chloride infusion, PRN  acetaminophen (TYLENOL) tablet 650 mg, Q4H PRN  0.9 % sodium chloride bolus, PRN  ondansetron (ZOFRAN) injection 4 mg, Q6H PRN  labetalol (NORMODYNE;TRANDATE) injection 10 mg, Q4H PRN  perflutren lipid microspheres (DEFINITY) injection 1.65 mg, ONCE PRN  metoprolol succinate (TOPROL XL) extended release tablet 25 mg, Daily  lisinopril (PRINIVIL;ZESTRIL) tablet 10 mg, Daily  rosuvastatin (CRESTOR) tablet 40 mg, Nightly  aspirin EC tablet 81 mg, Daily  ticagrelor (BRILINTA) tablet 90 mg, BID        Review of systems:     Heart: as above   Lungs: as above   Eyes: denies changes in vision or discharge. Ears: denies changes in hearing or pain. Nose: denies epistaxis or masses   Throat: denies sore throat or trouble swallowing. Neuro: denies numbness, tingling, tremors. Skin: denies rashes or itching. : denies hematuria, dysuria   GI: denies vomiting, diarrhea   Psych: denies mood changed, anxiety, depression. Physical exam:    Constitutional: A&O x3, communicates well, no acute distress. Eyes: extraocular muscles intact, PERRL. Normal lids & conjunctiva. No icterus. ENT: clear, no bleeding. No external masses. Lips normal formation. Neck: supple, full ROM, no JVD, no bruits, no lymphadenopathy. No masses. trachea midline. Heart: regular rate & rhythm, normal S1 & S2, no abnormal murmurs. No heave. Lungs: CTA. No accessory muscles. Abd: soft, non-tender. Normal bowel sounds. Neuro: Full ROM X 4, EOMI, no tremors. EXT: No bilateral lower extremity edema  Skin: warm, dry, intact. Good turgor. Psych: A&O x 3, normal behavior, not anxious. Radial access site: No complications. Assessment/Recommendations  STEMI. No symptoms today. Coronary artery disease. Status post stent to the LAD. Ischemic cardiomyopathy. Well compensated today. Diabetes   Hypercholesterolemia  hypertension      Note: This report was completed using computerized voice recognition software. Every effort has been made to ensure accuracy, however; and invert and computerized transcription errors may be present.

## 2022-07-23 NOTE — PLAN OF CARE
Problem: Pain  Goal: Verbalizes/displays adequate comfort level or baseline comfort level  Recent Flowsheet Documentation  Taken 7/22/2022 1125 by Gale Min RN  Verbalizes/displays adequate comfort level or baseline comfort level: Encourage patient to monitor pain and request assistance

## 2022-07-23 NOTE — PROGRESS NOTES
Discharge to home goals met. Discharge instructions given verbalized an understanding. Monitor removed and placed in nurses station.

## 2022-07-25 ENCOUNTER — TELEPHONE (OUTPATIENT)
Dept: CARDIOLOGY CLINIC | Age: 47
End: 2022-07-25

## 2022-07-25 NOTE — TELEPHONE ENCOUNTER
Records faxed  to [facility] in Dr. Lily Montez office    appointment scheduled 8-31 at  3:15 at the [place] office       [phone]

## 2022-08-02 NOTE — DISCHARGE SUMMARY
Physician Discharge Summary     Patient ID:  Sunita Dyer  [id number]  89 y.o.  1975    Admit date: 7/21/2022    Discharge date and time:  7/23/2022    Discharge Diagnoses: Active Problems:    STEMI (ST elevation myocardial infarction) ([facility] [address]    HLD (hyperlipidemia)    HTN (hypertension)    Ischemic cardiomyopathy  Resolved Problems:    * No resolved hospital problems. *      Consults: IP CONSULT TO CARDIAC REHAB  IP CONSULT TO CARDIAC REHAB    Procedures: See below    Hospital Course:  Patient admitted on 7/21/2022 40y.o. year old male  who  has a past medical history of Diabetes mellitus ([facility] [address] Hyperlipidemia, and Hypertension. Patient transferred from [facility] after presenting with substernal chest pain. Diagnosed with acute anterior MI. Transferred to [facility] for cardiac catheterization. This was performed without complications and revealed thrombotic occlusion of the LAD. Successful IVUS guided primary PCI with 3.5 x 30 RAI extending to the ostium and postdilated with 4.0 NC balloon at high pressure. Medicine consulted for admission.     Anterior STEMI status post RAI to mid LAD 7/21  -Admit to intermediate care  -Cardiology consult appreciated  -Aspirin 81 mg daily  -Brilinta 90 mg twice daily  -Rosuvastatin 40 mg daily  -GDMT-lisinopril, metoprolol succinate, spironolactone  -TTE demonstrating EF 35-40 %  -Cardiac rehab after discharge  A1c 5.3    BMP 3 days to monitor Kcl level    Discharge Exam:  See progress note from today    Condition:  Stable    Disposition: home    Patient Instructions:   Discharge Medication List as of 7/23/2022  2:40 PM        START taking these medications    Details   aspirin 81 MG EC tablet Take 1 tablet by mouth in the morning., Disp-30 tablet, R-3Normal      lisinopril (PRINIVIL;ZESTRIL) 10 MG tablet Take 1 tablet by mouth in the morning., Disp-30 tablet, R-3Normal      metoprolol succinate (TOPROL XL) 25 MG extended release tablet Take 1 tablet by mouth in the morning., Disp-30 tablet, R-3Normal      spironolactone (ALDACTONE) 25 MG tablet Take 1 tablet by mouth in the morning., Disp-30 tablet, R-3Normal      ticagrelor (BRILINTA) 90 MG TABS tablet Take 1 tablet by mouth in the morning and 1 tablet before bedtime. , Disp-60 tablet, R-3Normal           CONTINUE these medications which have CHANGED    Details   rosuvastatin (CRESTOR) 40 MG tablet Take 1 tablet by mouth nightly, Disp-30 tablet, R-3Normal           STOP taking these medications       hydroCHLOROthiazide (HYDRODIURIL) 25 MG tablet Comments:   Reason for Stopping:             Activity: activity as tolerated  Diet: regular diet    Follow-up with 1wk PCP, 1mth cardiology    Note that over 30 minutes was spent in preparing discharge papers, discussing discharge with patient, staff, consultants, medication review, arranging follow up, etc.    Signed:  Rosenda Sky MD  8/2/2022  8:01 AM